# Patient Record
Sex: MALE | Race: BLACK OR AFRICAN AMERICAN
[De-identification: names, ages, dates, MRNs, and addresses within clinical notes are randomized per-mention and may not be internally consistent; named-entity substitution may affect disease eponyms.]

---

## 2017-03-31 ENCOUNTER — HOSPITAL ENCOUNTER (OUTPATIENT)
Dept: HOSPITAL 61 - ENDOS | Age: 62
Discharge: HOME | End: 2017-03-31
Attending: INTERNAL MEDICINE
Payer: COMMERCIAL

## 2017-03-31 VITALS
DIASTOLIC BLOOD PRESSURE: 74 MMHG | DIASTOLIC BLOOD PRESSURE: 74 MMHG | DIASTOLIC BLOOD PRESSURE: 74 MMHG | SYSTOLIC BLOOD PRESSURE: 130 MMHG | SYSTOLIC BLOOD PRESSURE: 130 MMHG | DIASTOLIC BLOOD PRESSURE: 74 MMHG | SYSTOLIC BLOOD PRESSURE: 130 MMHG | SYSTOLIC BLOOD PRESSURE: 130 MMHG

## 2017-03-31 DIAGNOSIS — Z82.49: ICD-10-CM

## 2017-03-31 DIAGNOSIS — Z72.89: ICD-10-CM

## 2017-03-31 DIAGNOSIS — I10: ICD-10-CM

## 2017-03-31 DIAGNOSIS — E78.00: ICD-10-CM

## 2017-03-31 DIAGNOSIS — K21.9: ICD-10-CM

## 2017-03-31 DIAGNOSIS — Z83.3: ICD-10-CM

## 2017-03-31 DIAGNOSIS — Z87.891: ICD-10-CM

## 2017-03-31 DIAGNOSIS — Z82.3: ICD-10-CM

## 2017-03-31 DIAGNOSIS — M19.90: ICD-10-CM

## 2017-03-31 DIAGNOSIS — K64.0: Primary | ICD-10-CM

## 2017-03-31 PROCEDURE — 45378 DIAGNOSTIC COLONOSCOPY: CPT

## 2018-10-16 ENCOUNTER — HOSPITAL ENCOUNTER (OUTPATIENT)
Dept: HOSPITAL 61 - NM | Age: 63
Discharge: HOME | End: 2018-10-16
Attending: INTERNAL MEDICINE
Payer: COMMERCIAL

## 2018-10-16 DIAGNOSIS — I10: ICD-10-CM

## 2018-10-16 DIAGNOSIS — R06.09: Primary | ICD-10-CM

## 2018-10-16 DIAGNOSIS — E78.5: ICD-10-CM

## 2018-10-16 PROCEDURE — 93017 CV STRESS TEST TRACING ONLY: CPT

## 2018-10-16 PROCEDURE — 78452 HT MUSCLE IMAGE SPECT MULT: CPT

## 2018-10-16 PROCEDURE — A9500 TC99M SESTAMIBI: HCPCS

## 2018-10-16 PROCEDURE — 96375 TX/PRO/DX INJ NEW DRUG ADDON: CPT

## 2018-10-16 PROCEDURE — 96376 TX/PRO/DX INJ SAME DRUG ADON: CPT

## 2018-10-16 PROCEDURE — 96374 THER/PROPH/DIAG INJ IV PUSH: CPT

## 2018-10-16 NOTE — RAD
MR#: [id number]

Account#: [id number]

Accession#: [id number]

Date of Study: 10/16/2018

Ordering Physician: RISA MONTE, 

Referring Physician: CAITLIN WHITAKER Tech: NALINI Escamilla, ARRT (R) (N)





--------------- APPROVED REPORT --------------





Test Type:          Pharmacological

Stress Nurse/Tech: BRANT Tucker RN

Test Indications: Dyspnea on exertion

Cardiac History: Hyperlipidemia, HTN

Medications:     See Electronic Medical Record

Medical History: See Electronic Medical Record

Resting ECG:     SR, PAC

Resting Heart Rate: 58 bpm

Resting Blood Pressure: 137/84mmHg

Pretest Chest Pain: None



Nurse/Tech Notes

Lungs CTA, S1S2

Consent: The procedure was explained to the patient in lay terms. Informed consent was witnessed. Mukund
eout was entered into [employer]. History and Stress Test performed by brant Tucker RN



Pharm. Details

Pharmacologic stress testing was performed using 0.4mg per 5ml of regadenoson given intravenously ove
r 7-10 seconds.



Stress Symptoms

No chest pain or symptoms.



POST EXERCISE

Reason for Termination: Infusion complete

Max HR: 91 bpm

Max Blood Pressure: 142/85mmHg

Blood Pressure response to exercise: Normal blood pressure response during stress.

Heart Rate response to exercise: normal response

Chest Pain: No. 

Arrhythmia: Yes. PVC, PAC

ST Change: No. 



INTERPRETATION

Stress EKG Conclusion: The resting EKG shows a sinus rhythm with nonspecific ST-T wave changes and an
 incomplete right bundle branch block.

The stress EKG shows no significant changes from baseline.

No EKG evidence of stress-induced ischemia.



Imaging Protocol

IMAGE PROTOCOL: Rest Tc-99m/stress Tc-99m 1 day



Rest:            Stress:         Viability:   

Radiopharm.Tc99m GfiinrruxJg90u Sestamibi

[id number]            34mCi            

Img Date  10/16/2018      10/16/2018      

Inj-Img [id number].           90min.           



Rest Admin Site:IV - Right HandAdministrator:NALINI Escamilla, ARRT (R)(N)

Stress Admin Site: IV - Right HandAdministrator: RT Vinny (R)(N)



STRESS DATA

End Diast. Vol.154.0mlAv. Heart Rate57.0bpm

End Syst. Vol.48.0mlCO Index BSA0.0L/min

Myocardial Qray384.0gEject. Uszdukpn30.0%



Stress Rates

Pk. Fill Rate2.87EDV/secLVtime Pk. Fill 191.07msec

Pk. Empty Rate3.36ESV/secLVtime Pk. Trcwc197.15msec

1/3 Pk. Fill1.79EDV/sec



Stress Scores

Regional WT0.00Summed WT0.00

Regional WM0.00Summed WM0.00



LV Perfusion

The stress scans showed no significant defects.

The rest scans showed no significant defects.

Nuclear imaging shows no reversible ischemia or infarct.



Wall Motion

Normal left ventricular systolic function with ejection fraction of 69%.



LV Perf. Quant

17 Seg. SSS0.00

17 Seg. SRS0.00

17 Seg. SDS0.00

Stress Defect Extent (% LAD)0.00Rest Defect Extent (% LAD)0.00Rev. Defect Extent (% LAD)0.00

Stress Defect Extent (% LCX) 0.00Rest Defect Extent (% LCX)0.00Rev. Defect Extent (% LCX)0.00

Stress Defect Extent (% RCA)0.00Rest Defect Extent (% RCA)0.00Rev. Defect Extent (% RCA)0.00

Stress Defect Extent (% JENNIFER)0.00Rest Defect Extent (% JENNIFER)0.00Rev. Defect Extent (% JENNIFER)0.00



Conclusion

1. No EKG evidence of stressed induced ischemia.

2. Nuclear imaging shows no reversible ischemia or infarct.

3. Normal left ventricular systolic function with an ejection fraction of 69%.

4. Low risk Lexiscan nuclear stress test.



Signed by : Risa Monte MD

Electronically Approved : 10/16/2018 14:12:39

## 2019-01-14 ENCOUNTER — HOSPITAL ENCOUNTER (OUTPATIENT)
Dept: HOSPITAL 61 - KCIC | Age: 64
Discharge: HOME | End: 2019-01-14
Attending: FAMILY MEDICINE
Payer: COMMERCIAL

## 2019-01-14 DIAGNOSIS — Z87.891: ICD-10-CM

## 2019-01-14 DIAGNOSIS — R06.02: Primary | ICD-10-CM

## 2019-01-14 PROCEDURE — 71046 X-RAY EXAM CHEST 2 VIEWS: CPT

## 2019-01-14 NOTE — [facility]
EXAM: PA and Lateral Views of the Chest

 

DATE: 1/14/2019 12:00 AM

 

INDICATION: Shortness of air, previous smoker

 

COMPARISON: No Prior

 

FINDINGS:

The heart is not enlarged.

 

Mediastinal and hilar contours are normal.

 

No focal parenchymal airspace opacity.

 

No pleural effusion or pneumothorax.

 

 

 

IMPRESSION:

1.  No radiographic evidence for acute cardiopulmonary process.

 

Electronically signed by: Miguel Ángel Cristina MD (1/14/2019 5:48 PM) [facility]

## 2019-09-23 ENCOUNTER — HOSPITAL ENCOUNTER (OUTPATIENT)
Dept: HOSPITAL 61 - CT | Age: 64
Discharge: HOME | End: 2019-09-23
Attending: FAMILY MEDICINE
Payer: COMMERCIAL

## 2019-09-23 DIAGNOSIS — M19.072: ICD-10-CM

## 2019-09-23 DIAGNOSIS — M72.2: ICD-10-CM

## 2019-09-23 DIAGNOSIS — M19.071: ICD-10-CM

## 2019-09-23 DIAGNOSIS — M77.31: Primary | ICD-10-CM

## 2019-09-23 DIAGNOSIS — M76.61: ICD-10-CM

## 2019-09-23 PROCEDURE — 73700 CT LOWER EXTREMITY W/O DYE: CPT

## 2019-09-24 NOTE — RAD
Noncontrast CT study of the foot and ankle-bilateral exam

 

Clinical indications: Bilateral ankle and foot pain walking.

 

TECHNIQUE: Noncontrast helical CT scanning of the ankle and foot 

bilaterally was performed. Multiplanar 2-D reconstructions were generated.

 

 

 

PQRS compliance Statement

 

One or more of the following individualized dose reduction techniques were

utilized for this study:

1.  Automated exposure control

2.  Adjustment of the mA and/or kV according to patient size

3.  Use of iterative reconstruction technique

 

COMPARISON: None available.

 

RIGHT FOOT AND ANKLE: No acute fracture or dislocation or lytic process is

evident. No periosteal reaction is seen. There is mild primary 

degenerative osteoarthritis of the first metatarsal phalangeal joint. 

Small plantar spur of the calcaneus is seen. There is mild calcification 

of the plantar fascia which may be seen with calcific plantar fasciitis. 

No osseous tarsal coalition is seen. There is dorsal exostosis of the neck

of the talus. There is a prominent exostosis of the distal lateral aspect 

of the calcaneus. This is located anterior to the peroneal tendons. There 

is degenerative cyst formation of the tibial plafond and the dome of the 

talus along with degenerative spurring of the tibiotalar joint compartment

and joint space narrowing. This is consistent with moderate degenerative 

osteoarthritis of this joint compartment. No unstable osteochondral 

fragment is seen here by CT. No significant mortise ankle joint effusion 

is seen. There is degenerative spurring and joint space narrowing of the 

posterior and anteromedial subtalar joint compartments. There is calcific 

Achilles tendinitis present. No soft tissue mass or abscess is seen. No 

tenosynovitis is seen.

 

IMPRESSION: Moderate degenerative osteoarthritis of the tibiotalar joint 

compartment with prominent subchondral cyst formation of the anterior dome

of the talus and the anterior tibial plafond. There is a small dorsal spur

of the neck of the talus. No osseous tarsal coalition is seen.

 

Prominent lateral exostosis of the distal calcaneus.

 

No acute fracture or periosteal reaction.

 

Mild primary degenerative osteoarthritis of the first metatarsal 

phalangeal joint and subtalar joint compartments.

 

Calcific plantar fasciitis and calcific Achilles tendinitis. Small plantar

spur of the calcaneus.

 

 

 

 

LEFT FOOT AND ANKLE: No acute fracture or dislocation or lytic process is 

seen. No periosteal reaction is evident. Bipartite medial sesamoid bone is

seen which is a normal anatomical variant. There is mild degenerative 

osteoarthritis of the first metatarsal phalangeal joint. There is mild 

spurring and joint space narrowing of the tibiotalar joint compartment. No

significant mortise ankle joint effusion is seen. No significant 

subchondral cyst formation of the tibial plafond or dome of the talus is 

seen. No osseous tarsal coalition is seen. There is prominent spurring and

moderate joint space narrowing and subchondral cyst formation of the 

talonavicular joint compartment. There is moderate joint space narrowing 

and spurring of the posterior subtalar joint compartment. There is severe 

joint space narrowing and prominent spurring and moderate subchondral cyst

formation of the anterior medial subtalar joint compartment. There is a 

small plantar spur of the calcaneus. There is calcification of the plantar

fascia consistent with calcific plantar fasciitis. The Achilles tendon is 

unremarkable. No tenosynovitis is seen. No soft tissue mass or abscess is 

evident.

 

IMPRESSION: No acute fracture or periosteal reaction.

 

Degenerative osteoarthritis of the tibiotalar joint compartment and the 

talonavicular joint compartment and the subtalar joint compartments and 

the first metatarsal phalangeal joint. 

 

Calcific plantar fasciitis. Small plantar spur of the calcaneus.

 

Electronically signed by: Seven Mabry MD (9/24/2019 4:23 PM) [id number]

## 2019-12-16 ENCOUNTER — HOSPITAL ENCOUNTER (EMERGENCY)
Dept: HOSPITAL 61 - ER | Age: 64
Discharge: HOME | End: 2019-12-16
Payer: COMMERCIAL

## 2019-12-16 VITALS
SYSTOLIC BLOOD PRESSURE: 155 MMHG | DIASTOLIC BLOOD PRESSURE: 74 MMHG | SYSTOLIC BLOOD PRESSURE: 155 MMHG | DIASTOLIC BLOOD PRESSURE: 74 MMHG

## 2019-12-16 VITALS — HEIGHT: 71 IN | WEIGHT: 214 LBS | BODY MASS INDEX: 29.96 KG/M2

## 2019-12-16 DIAGNOSIS — J36: Primary | ICD-10-CM

## 2019-12-16 DIAGNOSIS — Z87.891: ICD-10-CM

## 2019-12-16 DIAGNOSIS — E78.00: ICD-10-CM

## 2019-12-16 DIAGNOSIS — Z88.2: ICD-10-CM

## 2019-12-16 DIAGNOSIS — I10: ICD-10-CM

## 2019-12-16 LAB
ALBUMIN SERPL-MCNC: 3.8 G/DL (ref 3.4–5)
ALBUMIN/GLOB SERPL: 0.8 {RATIO} (ref 1–1.7)
ALP SERPL-CCNC: 67 U/L (ref 46–116)
ALT SERPL-CCNC: 32 U/L (ref 16–63)
ANION GAP SERPL CALC-SCNC: 9 MMOL/L (ref 6–14)
APTT PPP: YELLOW S
AST SERPL-CCNC: 27 U/L (ref 15–37)
BACTERIA #/AREA URNS HPF: 0 /HPF
BASOPHILS # BLD AUTO: 0 X10^3/UL (ref 0–0.2)
BASOPHILS NFR BLD: 0 % (ref 0–3)
BILIRUB SERPL-MCNC: 0.6 MG/DL (ref 0.2–1)
BILIRUB UR QL STRIP: NEGATIVE
BUN SERPL-MCNC: 12 MG/DL (ref 8–26)
BUN/CREAT SERPL: 10 (ref 6–20)
CALCIUM SERPL-MCNC: 9.6 MG/DL (ref 8.5–10.1)
CHLORIDE SERPL-SCNC: 101 MMOL/L (ref 98–107)
CK SERPL-CCNC: 279 U/L (ref 39–308)
CO2 SERPL-SCNC: 32 MMOL/L (ref 21–32)
CREAT SERPL-MCNC: 1.2 MG/DL (ref 0.7–1.3)
EOSINOPHIL NFR BLD: 0.1 X10^3/UL (ref 0–0.7)
EOSINOPHIL NFR BLD: 1 % (ref 0–3)
ERYTHROCYTE [DISTWIDTH] IN BLOOD BY AUTOMATED COUNT: 15.2 % (ref 11.5–14.5)
FIBRINOGEN PPP-MCNC: CLEAR MG/DL
GFR SERPLBLD BASED ON 1.73 SQ M-ARVRAT: 74 ML/MIN
GLOBULIN SER-MCNC: 4.5 G/DL (ref 2.2–3.8)
GLUCOSE SERPL-MCNC: 139 MG/DL (ref 70–99)
HCT VFR BLD CALC: 44 % (ref 39–53)
HGB BLD-MCNC: 14.3 G/DL (ref 13–17.5)
LYMPHOCYTES # BLD: 0.8 X10^3/UL (ref 1–4.8)
LYMPHOCYTES NFR BLD AUTO: 6 % (ref 24–48)
MAGNESIUM SERPL-MCNC: 1.9 MG/DL (ref 1.8–2.4)
MCH RBC QN AUTO: 28 PG (ref 25–35)
MCHC RBC AUTO-ENTMCNC: 33 G/DL (ref 31–37)
MCV RBC AUTO: 87 FL (ref 79–100)
MONO #: 1.3 X10^3/UL (ref 0–1.1)
MONOCYTES NFR BLD: 10 % (ref 0–9)
MONONUCLEOSIS PATIENT: NEGATIVE
NEUT #: 10.5 X10^3/UL (ref 1.8–7.7)
NEUTROPHILS NFR BLD AUTO: 82 % (ref 31–73)
NITRITE UR QL STRIP: NEGATIVE
PH UR STRIP: 7 [PH]
PLATELET # BLD AUTO: 197 X10^3/UL (ref 140–400)
POTASSIUM SERPL-SCNC: 3.5 MMOL/L (ref 3.5–5.1)
PROT SERPL-MCNC: 8.3 G/DL (ref 6.4–8.2)
PROT UR STRIP-MCNC: NEGATIVE MG/DL
RBC # BLD AUTO: 5.07 X10^6/UL (ref 4.3–5.7)
RBC #/AREA URNS HPF: (no result) /HPF (ref 0–2)
SODIUM SERPL-SCNC: 142 MMOL/L (ref 136–145)
SQUAMOUS #/AREA URNS LPF: (no result) /LPF
UROBILINOGEN UR-MCNC: 0.2 MG/DL
WBC # BLD AUTO: 12.8 X10^3/UL (ref 4–11)
WBC #/AREA URNS HPF: (no result) /HPF (ref 0–4)

## 2019-12-16 PROCEDURE — 96361 HYDRATE IV INFUSION ADD-ON: CPT

## 2019-12-16 PROCEDURE — 70491 CT SOFT TISSUE NECK W/DYE: CPT

## 2019-12-16 PROCEDURE — 80053 COMPREHEN METABOLIC PANEL: CPT

## 2019-12-16 PROCEDURE — 96375 TX/PRO/DX INJ NEW DRUG ADDON: CPT

## 2019-12-16 PROCEDURE — 85025 COMPLETE CBC W/AUTO DIFF WBC: CPT

## 2019-12-16 PROCEDURE — 81001 URINALYSIS AUTO W/SCOPE: CPT

## 2019-12-16 PROCEDURE — 83605 ASSAY OF LACTIC ACID: CPT

## 2019-12-16 PROCEDURE — 87880 STREP A ASSAY W/OPTIC: CPT

## 2019-12-16 PROCEDURE — 36415 COLL VENOUS BLD VENIPUNCTURE: CPT

## 2019-12-16 PROCEDURE — 96365 THER/PROPH/DIAG IV INF INIT: CPT

## 2019-12-16 PROCEDURE — 99285 EMERGENCY DEPT VISIT HI MDM: CPT

## 2019-12-16 PROCEDURE — 83735 ASSAY OF MAGNESIUM: CPT

## 2019-12-16 PROCEDURE — 86308 HETEROPHILE ANTIBODY SCREEN: CPT

## 2019-12-16 PROCEDURE — 93005 ELECTROCARDIOGRAM TRACING: CPT

## 2019-12-16 PROCEDURE — 87070 CULTURE OTHR SPECIMN AEROBIC: CPT

## 2019-12-16 PROCEDURE — 84484 ASSAY OF TROPONIN QUANT: CPT

## 2019-12-16 PROCEDURE — 82553 CREATINE MB FRACTION: CPT

## 2019-12-16 NOTE — PHYS DOC
Past Medical History


Past Medical History:  High Cholesterol, Hypertension


Past Surgical History:  No Surgical History


Smoking:  Quit Greater Than 1 Year


Additional Information:  


Nonsmoker


Alcohol Use:  Rarely


Drug Use:  None





Adult General


Chief Complaint


Chief Complaint:  SYNCOPE





HPI


HPI





Patient is a 64  year old male with past medical history of hypertension and 

elevated cholesterol who presents with right-sided throat and neck pain since 

yesterday who also had a syncopal episode tonight at approximately 0030 when he 

got up from sleep because he was sweating profusely. He reports feeling 

lightheaded and falling to the ground but denies significant trauma to his head 

or any other extremity. He is not complaining of any musculoskeletal pain at 

this time. The patient reports painful swallowing and tenderness under his right

mandible and along his right throat. He denies any nausea, vomiting, or 

diarrhea. He likewise denies any chest pain, shortness of breath, abdominal 

pain, or dysuria.





Review of Systems


Review of Systems


Constitutional: Reports subjective fever/chills at home.


Eyes: Denies redness or eye pain 


HENT: Reports right-sided throat swelling/pain.


Respiratory: Denies cough or shortness of breath 


Cardiovascular: Denies chest pain or palpitations


GI: Denies abdominal pain, nausea, or vomiting


: Denies dysuria or hematuria


Musculoskeletal: Denies back pain or joint pain


Integument: Denies rash or skin lesions 


Neurologic: Denies headache, focal weakness or sensory changes





Complete systems were reviewed and found to be within normal limits, except as 

documented in this note.





Current Medications


Current Medications





Current Medications








 Medications


  (Trade)  Dose


 Ordered  Sig/Freeman  Start Time


 Stop Time Status Last Admin


Dose Admin


 


 Clindamycin


 Phosphate  50 ml @ 


 100 mls/hr  1X  ONCE  12/16/19 03:45


 12/16/19 04:14 DC 12/16/19 03:45


100 MLS/HR


 


 Dexamethasone


 Sodium Phosphate


  (Decadron)  10 mg  1X  ONCE  12/16/19 02:00


 12/16/19 02:01 DC 12/16/19 02:36


10 MG


 


 Fentanyl Citrate


  (Fentanyl 2ml


 Vial)  50 mcg  1X  ONCE  12/16/19 03:45


 12/16/19 03:46 DC 12/16/19 04:24


50 MCG


 


 Info


  (CONTRAST GIVEN


 -- Rx MONITORING)  1 each  PRN DAILY  PRN  12/16/19 02:15


 12/16/19 04:29 DC  





 


 Iohexol


  (Omnipaque 300


 Mg/ml)  70 ml  1X  ONCE  12/16/19 02:15


 12/16/19 02:16 DC 12/16/19 02:30


70 ML


 


 Ketorolac


 Tromethamine


  (Toradol 15mg


 Vial)  15 mg  1X  ONCE  12/16/19 02:00


 12/16/19 02:01 DC 12/16/19 02:36


15 MG


 


 Sodium Chloride  1,000 ml @ 


 1,000 mls/hr  1X  ONCE  12/16/19 01:30


 12/16/19 02:29 DC 12/16/19 01:42


1,000 MLS/HR











Allergies


Allergies





Allergies








Coded Allergies Type Severity Reaction Last Updated Verified


 


  Sulfa (Sulfonamide Antibiotics) Allergy Mild  10/16/18 Yes











Physical Exam


Physical Exam


Constitutional: Well developed, well nourished, no acute distress, non-toxic 

appearance


HENT: Normocephalic, atraumatic. Right submandibular swelling and TTP. 

Erythematous pharynx with right tonsillar swelling and movement of uvular toward

left


Eyes: conjunctiva normal, no discharge


Neck: Normal range of motion, tenderness to right side of throat, supple


Cardiovascular: Heart rate normal, regular rhythm


Lungs & Thorax:  Bilateral breath sounds clear to auscultation, no wheezing


Skin: Warm, dry, no erythema, no rash


Extremities: No tenderness, ROM intact, no edema


Psychologic: Affect normal, judgement normal





Current Patient Data


Vital Signs





                                   Vital Signs








  Date Time  Temp Pulse Resp B/P (MAP) Pulse Ox O2 Delivery O2 Flow Rate FiO2


 


12/16/19 04:24   20  99 Room Air  


 


12/16/19 03:56  60  155/74 (101)    


 


12/16/19 01:35 98.5       





 98.5       








Lab Values





                                Laboratory Tests








Test


 12/16/19


01:30 12/16/19


02:45


 


White Blood Count


 12.8 x10^3/uL


(4.0-11.0)  H 





 


Red Blood Count


 5.07 x10^6/uL


(4.30-5.70) 





 


Hemoglobin


 14.3 g/dL


(13.0-17.5) 





 


Hematocrit


 44.0 %


(39.0-53.0) 





 


Mean Corpuscular Volume


 87 fL ()


 





 


Mean Corpuscular Hemoglobin 28 pg (25-35)   


 


Mean Corpuscular Hemoglobin


Concent 33 g/dL


(31-37) 





 


Red Cell Distribution Width


 15.2 %


(11.5-14.5)  H 





 


Platelet Count


 197 x10^3/uL


(140-400) 





 


Neutrophils (%) (Auto) 82 % (31-73)  H 


 


Lymphocytes (%) (Auto) 6 % (24-48)  L 


 


Monocytes (%) (Auto) 10 % (0-9)  H 


 


Eosinophils (%) (Auto) 1 % (0-3)   


 


Basophils (%) (Auto) 0 % (0-3)   


 


Neutrophils # (Auto)


 10.5 x10^3/uL


(1.8-7.7)  H 





 


Lymphocytes # (Auto)


 0.8 x10^3/uL


(1.0-4.8)  L 





 


Monocytes # (Auto)


 1.3 x10^3/uL


(0.0-1.1)  H 





 


Eosinophils # (Auto)


 0.1 x10^3/uL


(0.0-0.7) 





 


Basophils # (Auto)


 0.0 x10^3/uL


(0.0-0.2) 





 


Sodium Level


 142 mmol/L


(136-145) 





 


Potassium Level


 3.5 mmol/L


(3.5-5.1) 





 


Chloride Level


 101 mmol/L


() 





 


Carbon Dioxide Level


 32 mmol/L


(21-32) 





 


Anion Gap 9 (6-14)   


 


Blood Urea Nitrogen


 12 mg/dL


(8-26) 





 


Creatinine


 1.2 mg/dL


(0.7-1.3) 





 


Estimated GFR


(Cockcroft-Gault) 74.0  


 





 


BUN/Creatinine Ratio 10 (6-20)   


 


Glucose Level


 139 mg/dL


(70-99)  H 





 


Lactic Acid Level


 1.0 mmol/L


(0.4-2.0) 





 


Calcium Level


 9.6 mg/dL


(8.5-10.1) 





 


Magnesium Level


 1.9 mg/dL


(1.8-2.4) 





 


Total Bilirubin


 0.6 mg/dL


(0.2-1.0) 





 


Aspartate Amino Transferase


(AST) 27 U/L (15-37)


 





 


Alanine Aminotransferase (ALT)


 32 U/L (16-63)


 





 


Alkaline Phosphatase


 67 U/L


() 





 


Creatine Kinase


 279 U/L


() 





 


Creatine Kinase MB (Mass)


 1.3 ng/mL


(0.0-3.6) 





 


Creatine Kinase MB Relative


Index 0.5 % (0-4)  


 





 


Troponin I Quantitative


 < 0.017 ng/mL


(0.000-0.055) 





 


Total Protein


 8.3 g/dL


(6.4-8.2)  H 





 


Albumin


 3.8 g/dL


(3.4-5.0) 





 


Albumin/Globulin Ratio


 0.8 (1.0-1.7)


L 





 


Heterophil Agglutinins


 Negative


(NEGATIVE) 





 


Urine Collection Type  Unknown  


 


Urine Color  Yellow  


 


Urine Clarity  Clear  


 


Urine pH  7.0  


 


Urine Specific Gravity  1.010  


 


Urine Protein


 


 Negative mg/dL


(NEG-TRACE)


 


Urine Glucose (UA)


 


 Negative mg/dL


(NEG)


 


Urine Ketones (Stick)


 


 Negative mg/dL


(NEG)


 


Urine Blood


 


 Negative (NEG)





 


Urine Nitrite


 


 Negative (NEG)





 


Urine Bilirubin


 


 Negative (NEG)





 


Urine Urobilinogen Dipstick


 


 0.2 mg/dL (0.2


mg/dL)


 


Urine Leukocyte Esterase


 


 Negative (NEG)





 


Urine RBC


 


 Occ /HPF (0-2)





 


Urine WBC


 


 Occ /HPF (0-4)





 


Urine Squamous Epithelial


Cells 


 Occ /LPF  





 


Urine Bacteria


 


 0 /HPF (0-FEW)





 


Urine Mucus  Slight /LPF  


 


Group A Streptococcus Rapid


 


 Negative


(NEGATIVE)





                                Laboratory Tests


12/16/19 01:30








                                Laboratory Tests


12/16/19 01:30











EKG


EKG


@0129 NSR at 60bpm, NO ST elevation, RBBB





Radiology/Procedures


Radiology/Procedures


PROCEDURE: CT SOFT TISSUE NECK W/CONTRAST





CT SOFT TISSUE NECK W/CONTRAST


 


DATE: 12/16/2019 1:45 AM


 


INDICATION: Right submandibular swelling and pain 


 


TECHNIQUE: Axial computed tomography of the neck with intravenous contrast


according to the standard neck protocol. 70 cc of Omnipaque 300 was 


administered intravenously. One or more of the following dose reduction 


techniques were utilized:  Automated exposure control (AEC), Adjustment of


mA and/or kV according to patient size, Use of iterative reconstruction 


technique such as ASiR, CT scan done according to ALARA and image 


gently/image wisely


 


COMPARISON: None.


 


FINDINGS: 


 


Enlargement of the right greater than left palatine tonsils, with region 


of low attenuation in the right palatine tonsil measuring 1 cm. 


Enlargement of the uvula and generalized oropharyngeal mucosal thickening.


Stranding in the right parapharyngeal fat which extends into the 


hypopharynx, with effacement of the right piriform sinus and partial 


effacement of the epiglottic vallecula on the right. Thickening of the 


right aryepiglottic fold. 


 


Moderate to severe narrowing of the airway, particularly the oropharynx 


and the supraglottic larynx.


 


Enlarged right cervical lymph nodes, likely reactive. The parotid, 


submandibular, and thyroid glands are normal. The muscles of the neck are 


normal. Vessels of the neck demonstrate normal course, caliber, and 


enhancement. The visualized aerodigestive tract is normal.


 


The visualized posterior fossa and brain is unremarkable. The visualized 


orbits and paranasal sinuses are normal. 


 


Mild multilevel degenerative disc desiccation. Multilevel spinal canal 


stenosis secondary to disc protrusions and marginal osteophytes. 


Multilevel neural foraminal narrowing secondary to uncovertebral and facet


arthrosis. 


 


The visualized lung apices are clear. 


 


IMPRESSION:  


Enlargement of the palatine tonsils with 1 cm region of low attenuation in


the right palatine tonsil, likely phlegmon or small abscess. Extensive 


oropharyngeal inflammation with moderate to severe narrowing of the 


oropharynx. 


 


Inflammation extends inferiorly along the right side to involve the 


supraglottic larynx, which is also narrowed.


 


Asymmetric enlarged right cervical lymph nodes, likely reactive.


 


Electronically signed by: Garfield Dailey MD (12/16/2019 3:00 AM) 


[facility]


[]





Course & Med Decision Making


Course & Med Decision Making


Pertinent Labs and Imaging studies reviewed. (See chart for details)





Patient is a 64-year-old male who presents with right sided submandibular and 

throat swelling, along with near-syncopal episode tonight. The patient denies 

any loss of consciousness, lightheadedness, shortness of breath, or chest pain 

at this time. However, he is exhibiting difficulty speaking and swallowing. CT 

of the soft tissue of the neck was ordered which showed small early 

paratonsillar abscess. The patient was swabbed for strep throat and 

mononucleosis, which both came back negative. The patient was administered 

Toradol 15 mg, dexamethasone 10 mg, and IV fluids to help with pain and 

swelling. EKG of the heart, Urinalysis, and labs were obtained to evaluate for 

potential causes of his syncopal episode. UA and EKG were normal. Labs were 

largely unremarkable.





Empiric antibiotics given.  Patient offered transfer to facility with ENT 

coverage (as no on-call ENT at [place] at this time) vs outpatient followup 

on oral antibiotics with ENT/PCP with understanding to return for any worsening 

of symptoms.  Patient elected to be discharge home at this time and will return 

for worsening of symptoms.





Patient stable for discharge with outpatient follow-up with PCP/ENT. ENT 

referral provided. Discussed findings and plan with patient and family, who 

acknowledge understanding and agreement.





Dragon Disclaimer


Dragon Disclaimer


This electronic medical record was generated, in whole or in part, using a voice

 recognition dictation system.





Departure


Departure


Impression:  


   Primary Impression:  


   Peritonsillar abscess


Disposition:  01 HOME, SELF-CARE


Condition:  STABLE


Referrals:  


ARNAUD ODELL MD (PCP)








HAYLIE ALBERTS MD


Patient Instructions:  Peritonsillar Abscess, Easy-to-Read


Scripts


Hydrocodone Bit/Acetaminophen (HYDROCODONE-APAP 7.5-325/15 SOLN  **) 15 Ml 

Solution


10 ML PO PRN Q6HRS PRN for PAIN, #200 ML 0 Refills


   Prov: CHERRIE QUIÑONEZ DO         12/16/19 


Prednisolone (PREDNISOLONE) 15 Mg/5 Ml Solution


10 ML PO DAILY for 5 Days, #50 ML 0 Refills


   Prov: CHERRIE QUIÑONEZ DO         12/16/19 


Clindamycin Hcl (CLINDAMYCIN HCL) 300 Mg Capsule


1 CAP PO TID for Infection, #21 CAP


   Prov: CHERRIE QUIÑONEZ DO         12/16/19











CHERRIE QUIÑONEZ DO             Dec 16, 2019 01:36

## 2019-12-16 NOTE — EKG
[facility] acute hospital

              [address] [place] [address]

Test Date:    2019               Test Time:    01:29:15

Pat Name:     RISA ZULUAGA             Department:   

Patient ID:   [id number]           Room:          

Gender:       M                        Technician:   

:          [id number]               Requested By: CHERRIE QUIÑONEZ

Order Number: [id number]           Reading MD:     

                                 Measurements

Intervals                              Axis          

Rate:         60                       P:            43

VA:           156                      QRS:          35

QRSD:         124                      T:            47

QT:           432                                    

QTc:          436                                    

                           Interpretive Statements

SINUS RHYTHM

LEFT ATRIAL ABNORMALITY

ABNORMAL ECG

RI6.01

No previous ECG available for comparison

## 2019-12-16 NOTE — RAD
CT SOFT TISSUE NECK W/CONTRAST

 

DATE: 12/16/2019 1:45 AM

 

INDICATION: Right submandibular swelling and pain 

 

TECHNIQUE: Axial computed tomography of the neck with intravenous contrast

according to the standard neck protocol. 70 cc of Omnipaque 300 was 

administered intravenously. One or more of the following dose reduction 

techniques were utilized:  Automated exposure control (AEC), Adjustment of

mA and/or kV according to patient size, Use of iterative reconstruction 

technique such as ASiR, CT scan done according to ALARA and image 

gently/image wisely

 

COMPARISON: None.

 

FINDINGS: 

 

Enlargement of the right greater than left palatine tonsils, with region 

of low attenuation in the right palatine tonsil measuring 1 cm. 

Enlargement of the uvula and generalized oropharyngeal mucosal thickening.

Stranding in the right parapharyngeal fat which extends into the 

hypopharynx, with effacement of the right piriform sinus and partial 

effacement of the epiglottic vallecula on the right. Thickening of the 

right aryepiglottic fold. 

 

Moderate to severe narrowing of the airway, particularly the oropharynx 

and the supraglottic larynx.

 

Enlarged right cervical lymph nodes, likely reactive. The parotid, 

submandibular, and thyroid glands are normal. The muscles of the neck are 

normal. Vessels of the neck demonstrate normal course, caliber, and 

enhancement. The visualized aerodigestive tract is normal.

 

The visualized posterior fossa and brain is unremarkable. The visualized 

orbits and paranasal sinuses are normal. 

 

Mild multilevel degenerative disc desiccation. Multilevel spinal canal 

stenosis secondary to disc protrusions and marginal osteophytes. 

Multilevel neural foraminal narrowing secondary to uncovertebral and facet

arthrosis. 

 

The visualized lung apices are clear. 

 

IMPRESSION:  

Enlargement of the palatine tonsils with 1 cm region of low attenuation in

the right palatine tonsil, likely phlegmon or small abscess. Extensive 

oropharyngeal inflammation with moderate to severe narrowing of the 

oropharynx. 

 

Inflammation extends inferiorly along the right side to involve the 

supraglottic larynx, which is also narrowed.

 

Asymmetric enlarged right cervical lymph nodes, likely reactive.

 

Electronically signed by: Garfield Dailey MD (12/16/2019 3:00 AM) 

[facility]

## 2022-04-01 ENCOUNTER — HOSPITAL ENCOUNTER (OUTPATIENT)
Dept: HOSPITAL 61 - KCIC | Age: 67
End: 2022-04-01
Attending: FAMILY MEDICINE
Payer: COMMERCIAL

## 2022-04-01 DIAGNOSIS — M76.891: ICD-10-CM

## 2022-04-01 DIAGNOSIS — M25.861: ICD-10-CM

## 2022-04-01 DIAGNOSIS — M65.262: Primary | ICD-10-CM

## 2022-04-01 DIAGNOSIS — M11.261: ICD-10-CM

## 2022-04-01 DIAGNOSIS — M25.761: ICD-10-CM

## 2022-04-01 PROCEDURE — 73552 X-RAY EXAM OF FEMUR 2/>: CPT

## 2022-04-01 PROCEDURE — 73590 X-RAY EXAM OF LOWER LEG: CPT

## 2022-04-01 PROCEDURE — 73562 X-RAY EXAM OF KNEE 3: CPT

## 2022-04-02 NOTE — [facility]
EXAMINATION: XR KNEE 3 VIEWS_RT, XR RT TIBIA+FIBULA , XR FEMUR_RIGHT  



CLINICAL HISTORY: Chronic right lower extremity.



TECHNIQUE: XR KNEE 3 VIEWS_RT, XR RT TIBIA+FIBULA , XR FEMUR_RIGHT



COMPARISON: None





FINDINGS/

IMPRESSION:  



Right hip joint incompletely evaluated.



Moderate mediolateral compartment narrowing in the knee with tiny marginal osteophytes and chondrocal
cinosis. Patellar enthesophytes. No significant joint effusion.



Joints at the ankle incompletely evaluated.



No acute fracture.



Dystrophic calcification at the Achilles insertion, compatible with chronic tendinosis. No focal soft
 tissue swelling.



Electronically signed by: Allen Crane DO (4/2/2022 4:39 AM) [facility]